# Patient Record
Sex: FEMALE | Race: BLACK OR AFRICAN AMERICAN | NOT HISPANIC OR LATINO | Employment: OTHER | URBAN - METROPOLITAN AREA
[De-identification: names, ages, dates, MRNs, and addresses within clinical notes are randomized per-mention and may not be internally consistent; named-entity substitution may affect disease eponyms.]

---

## 2018-01-11 ENCOUNTER — CLINICAL SUPPORT (OUTPATIENT)
Dept: URGENT CARE | Facility: CLINIC | Age: 33
End: 2018-01-11

## 2018-01-11 DIAGNOSIS — Z02.83 ENCOUNTER FOR DRUG SCREENING: Primary | ICD-10-CM

## 2018-01-11 PROCEDURE — 80305 DRUG TEST PRSMV DIR OPT OBS: CPT | Mod: S$GLB,,, | Performed by: PREVENTIVE MEDICINE

## 2019-02-13 PROBLEM — D64.9 ANEMIA: Status: ACTIVE | Noted: 2019-02-13

## 2019-02-14 PROBLEM — D57.3 SICKLE CELL TRAIT: Status: ACTIVE | Noted: 2019-02-14

## 2019-02-14 PROBLEM — D50.0 IRON DEFICIENCY ANEMIA DUE TO CHRONIC BLOOD LOSS: Status: ACTIVE | Noted: 2019-02-14

## 2019-12-31 ENCOUNTER — OCCUPATIONAL HEALTH (OUTPATIENT)
Dept: URGENT CARE | Facility: CLINIC | Age: 34
End: 2019-12-31

## 2019-12-31 DIAGNOSIS — Z02.83 ENCOUNTER FOR DRUG SCREENING: Primary | ICD-10-CM

## 2019-12-31 PROCEDURE — 80305 OOH COLLECTION ONLY DRUG SCREEN: ICD-10-PCS | Mod: S$GLB,,, | Performed by: PREVENTIVE MEDICINE

## 2019-12-31 PROCEDURE — 80305 DRUG TEST PRSMV DIR OPT OBS: CPT | Mod: S$GLB,,, | Performed by: PREVENTIVE MEDICINE

## 2019-12-31 RX ORDER — HYDROXYPROGESTERONE CAPROATE 250 MG/ML
250 INJECTION INTRAMUSCULAR
COMMUNITY
Start: 2019-12-05 | End: 2020-01-02

## 2019-12-31 RX ORDER — FERROUS SULFATE 325(65) MG
325 TABLET ORAL
COMMUNITY
Start: 2019-09-16 | End: 2020-09-15

## 2019-12-31 RX ORDER — HYDROXYPROGESTERONE CAPROATE 1250 MG/5ML
250 INJECTION INTRAMUSCULAR
COMMUNITY
Start: 2019-12-05

## 2023-02-24 PROBLEM — U07.1 COVID-19: Status: ACTIVE | Noted: 2023-02-24

## 2023-02-28 ENCOUNTER — PATIENT OUTREACH (OUTPATIENT)
Dept: ADMINISTRATIVE | Facility: CLINIC | Age: 38
End: 2023-02-28
Payer: MEDICARE

## 2023-02-28 DIAGNOSIS — U07.1 COVID-19: Primary | ICD-10-CM

## 2023-02-28 NOTE — PROGRESS NOTES
C3 nurse spoke with Peter Long  for a TCC post hospital discharge follow up call. The patient reports does not have a scheduled HOSFU appointment. C3 nurse was unable to schedule HOSFU appointment for [facility]. Patient advised to contact their PCP to schedule a HOSPFU within 5-7 days.     Referral placed for [facility].

## 2023-03-03 ENCOUNTER — PES CALL (OUTPATIENT)
Dept: ADMINISTRATIVE | Facility: CLINIC | Age: 38
End: 2023-03-03
Payer: MEDICARE

## 2023-03-09 ENCOUNTER — OFFICE VISIT (OUTPATIENT)
Dept: HOME HEALTH SERVICES | Facility: CLINIC | Age: 38
End: 2023-03-09
Payer: MEDICAID

## 2023-03-09 DIAGNOSIS — D50.0 IRON DEFICIENCY ANEMIA DUE TO CHRONIC BLOOD LOSS: ICD-10-CM

## 2023-03-09 DIAGNOSIS — U07.1 COVID-19: ICD-10-CM

## 2023-03-09 PROCEDURE — 3078F PR MOST RECENT DIASTOLIC BLOOD PRESSURE < 80 MM HG: ICD-10-PCS | Mod: CPTII,S$GLB,, | Performed by: NURSE PRACTITIONER

## 2023-03-09 PROCEDURE — 99497 ADVNCD CARE PLAN 30 MIN: CPT | Mod: S$GLB,,, | Performed by: NURSE PRACTITIONER

## 2023-03-09 PROCEDURE — 99497 PR ADVNCD CARE PLAN 30 MIN: ICD-10-PCS | Mod: S$GLB,,, | Performed by: NURSE PRACTITIONER

## 2023-03-09 PROCEDURE — 3078F DIAST BP <80 MM HG: CPT | Mod: CPTII,S$GLB,, | Performed by: NURSE PRACTITIONER

## 2023-03-09 PROCEDURE — 99495 TCM SERVICES (MODERATE COMPLEXITY): ICD-10-PCS | Mod: S$GLB,,, | Performed by: NURSE PRACTITIONER

## 2023-03-09 PROCEDURE — 99495 TRANSJ CARE MGMT MOD F2F 14D: CPT | Mod: S$GLB,,, | Performed by: NURSE PRACTITIONER

## 2023-03-09 PROCEDURE — 3075F PR MOST RECENT SYSTOLIC BLOOD PRESS GE 130-139MM HG: ICD-10-PCS | Mod: CPTII,S$GLB,, | Performed by: NURSE PRACTITIONER

## 2023-03-09 PROCEDURE — 3075F SYST BP GE 130 - 139MM HG: CPT | Mod: CPTII,S$GLB,, | Performed by: NURSE PRACTITIONER

## 2023-03-10 VITALS
RESPIRATION RATE: 20 BRPM | SYSTOLIC BLOOD PRESSURE: 134 MMHG | OXYGEN SATURATION: 100 % | HEART RATE: 64 BPM | DIASTOLIC BLOOD PRESSURE: 71 MMHG | TEMPERATURE: 98 F

## 2023-03-10 NOTE — ASSESSMENT & PLAN NOTE
No acute respiratory symptoms  No symptoms of distress  Vitals stable  Continue albuterol prn  Schedule follow up with PCP at [facility]

## 2023-03-10 NOTE — PROGRESS NOTES
"[facility]  Transition of Care Home Visit    Visit Date: 3/9/2023  Encounter Provider: Amarilis Melgar NP  PCP:  Primary Doctor No    PRESENTING HISTORY      Patient ID: Peter Long 37 y.o. female.    Consult Requested By:  Emelia Melgar  Reason for Consult:  Hospital Follow Up    Chief Complaint: Hospital Follow Up    The patient is being seen at home due to a physical debility that presents a taxing effort to leave the home, to mitigate high risk of hospital readmission or due to the limited availability of reliable or safe options for transportation to the point of access to the provider. The visit meets the criteria for medical necessity as defined by CMS as "health-care services needed to prevent, diagnose, or treat an illness, injury, condition, disease, or its symptoms and that meet accepted standards of medicine." Prior to treatment on this visit the chart was reviewed and patient consent was obtained.    HPI: 37 y.o. female with past medical history of severe Covid in past which included intubation, anemia.     Hospital Course: She was treated for covid infection with remdesivir, oxygen.  She was eventually weaned off O2.  She was discharged once stable and sent home with steroids, Zithromax.       Today:  Ms. Peter Long is a 37 y.o. female is being seen and examined at home today for transitional care visit to the home environment post-discharge from inpatient hospitalization encounter described above. Peter presents at baseline state of health as reported by patient and caregiver. VSS. No acute respiratory symptoms noted.  VSS. Denies fevers, chills, cough, congestion, change in bladder habits, change in bowel habits. Reports taking all medications as prescribed. Instructed to schedule follow up with PCP at [facility]. No other needs identified at this time. Risks of environmental exposure to coronavirus discussed including: social distancing, hand hygiene, and " limiting departures from the home for necessities only.  Reports understanding and willingness to comply.     Attestation: Screening criteria to assess the level of the patient's risk for infection with COVID-19 as recommended by the CDC at the time of the above documented home visit concluded appropriateness to proceed. Universal precautions were maintained at all times, including provider use of >60% alcohol gel hand  immediately prior to entry and upon departing the patient's home as well as cleaning of equipment used in home visit with antibacterial/germicidal disposable wipes.    Admission Date: 2/24/2023  Hospital Length of Stay: 2 days  Discharge Date and Time:  02/26/2023 2:04 PM  _________________________________________________________________    Review of Systems   Constitutional:  Negative for chills and fever.   HENT:  Negative for congestion, postnasal drip and rhinorrhea.    Eyes:  Negative for photophobia and visual disturbance.   Respiratory:  Negative for cough, chest tightness, shortness of breath and wheezing.    Cardiovascular:  Negative for chest pain and palpitations.   Gastrointestinal:  Negative for abdominal pain, diarrhea, nausea and vomiting.   Genitourinary:  Negative for difficulty urinating.   Musculoskeletal:  Negative for arthralgias, gait problem and myalgias.   Skin:  Negative for color change.   Neurological:  Negative for dizziness, weakness and light-headedness.   Hematological:  Does not bruise/bleed easily.   Psychiatric/Behavioral:  Negative for agitation.      Assessments:  Environmental: adequate lighting and temperature control  Functional Status: Independent with ADL's/IADL's,  continent of bowel and bladder  Safety: Fall Precautions, COVID Precautions/Social Distancing/Mask Use  Nutritional: Adequate  Home Health: none  DME/Supplies: none    Ethical / Legal: Advance Care Planning   Capacity to make medical decisions:  yes, Conflict no  Surrogate decision maker:   Name Juvenal, Relationship: significant other  Advance Directives:  no  HCPOA: no  LaPOST:  no  Code Status:  full code    Advance Care Planning/Goals of Care:  Advanced Care Directives, HCPOA and LaPost forms left in the home for family review, discussion and signing with instructions to return upon their next provider encounter for inclusion to the medical record. Discussed Advance Care Planning for 20 minutes.    PAST HISTORY:     Past Medical History:   Diagnosis Date    COVID-19     Sickle cell trait        Past Surgical History:   Procedure Laterality Date     SECTION      x5     SECTION      TONSILLECTOMY      tubal reversal  2014 [place].       History reviewed. No pertinent family history.    Social History     Socioeconomic History    Marital status: Single   Tobacco Use    Smoking status: Never    Smokeless tobacco: Never   Substance and Sexual Activity    Alcohol use: No    Drug use: No    Sexual activity: Yes     Partners: Male       MEDICATIONS & ALLERGIES:     Current Outpatient Medications on File Prior to Visit   Medication Sig Dispense Refill    acetaminophen (TYLENOL) 500 mg Cap Take 2 capsules (1,000 mg total) by mouth every 8 (eight) hours as needed (pain/fever). 30 capsule 0    albuterol (PROVENTIL/VENTOLIN HFA) 90 mcg/actuation inhaler Inhale 1-2 puffs into the lungs every 6 (six) hours as needed for Wheezing. Rescue 18 g 0    albuterol (PROVENTIL/VENTOLIN HFA) 90 mcg/actuation inhaler Inhale 1-2 puffs into the lungs every 6 (six) hours as needed for Wheezing or Shortness of Breath. Rescue 6.7 g 0    albuterol sulfate 2.5 mg/0.5 mL Nebu Take 2.5 mg by nebulization every 4 (four) hours as needed. Rescue 30 each 0    ferrous sulfate 325 (65 FE) MG EC tablet Take 2 tablets (650 mg total) by mouth 2 (two) times daily. (Patient not taking: Reported on 2023) 120 tablet 5    HYDROXYprogesterone (TRE) injection Inject 250 mg into the muscle.      pantoprazole (PROTONIX) 40 MG tablet  Take 1 tablet (40 mg total) by mouth once daily. 30 tablet 11    PNV,calcium 72-iron-folic acid (PRENATAL VITAMIN PLUS LOW IRON) 27 mg iron- 1 mg Tab Take 1 tablet by mouth.       No current facility-administered medications on file prior to visit.        Review of patient's allergies indicates:   Allergen Reactions    Dexamethasone Itching       OBJECTIVE:     Vital Signs:  Vitals:    03/09/23 1300   BP: 134/71   Pulse: 64   Resp: 20   Temp: 98 °F (36.7 °C)     There is no height or weight on file to calculate BMI.       Physical Exam  HENT:      Head: Normocephalic and atraumatic.      Nose: No congestion or rhinorrhea.      Mouth/Throat:      Mouth: Mucous membranes are moist.   Cardiovascular:      Rate and Rhythm: Normal rate.      Pulses: Normal pulses.   Pulmonary:      Effort: No respiratory distress.      Breath sounds: Normal breath sounds.   Abdominal:      General: Bowel sounds are normal.      Palpations: Abdomen is soft.   Musculoskeletal:      Cervical back: Normal range of motion and neck supple.      Right lower leg: No edema.      Left lower leg: No edema.   Skin:     General: Skin is warm and dry.   Neurological:      Mental Status: She is alert and oriented to person, place, and time.     Laboratory  Lab Results   Component Value Date    WBC 12.29 02/24/2023    HGB 9.6 (L) 02/24/2023    HCT 33.4 (L) 02/24/2023    MCV 65 (L) 02/24/2023     (L) 02/24/2023     No results found for: INR, PROTIME  No results found for: HGBA1C  No results for input(s): POCTGLUCOSE in the last 72 hours.    TRANSITION OF CARE:     [facility] Contact Note: 2/28/23    Family and/or Caretaker present at visit?  Yes.  Diagnostic tests reviewed/disposition: No diagnosic tests pending after this hospitalization.  Disease/illness education: Importance of Compliance with all prescribed medications and treatments, COVID Precautions/Social Distancing/Mask Use  Home health/community services discussion/referrals:  Patient does not have home health established from hospital visit.  They do not need home health.  If needed, we will set up home health for the patient.   Establishment or re-establishment of referral orders for community resources: No other necessary community resources.   Discussion with other health care providers: No discussion with other health care providers necessary.     Transition of Care Visit:  I have reviewed and updated the history and problem list. I have reconciled the medication list. I have discussed the hospitalization and current medical issues, prognosis and plans with the patient/family.     Medications Reconciliation:   I have reconciled the patient's home medications and discharge medications with the patient/family. I have updated all changes. Refer to After-Visit Medication List.    Discharge plans, follow-up instructions, future appointments, provider contact information, indicators to seek emergency treatment and encouragement to call for any questions, concerns or clarification of the patient's plan of care explained to patient and/or caregiver(s), whom confirm understanding of provided information and endorse willingness to comply.     ASSESSMENT & PLAN:     HIGH RISK CONDITION(S):  Patient has a condition that poses threat to life and bodily function: s/p recent hospitalization for covid 19.      1. COVID-19  Overview:  Hx of severe Covid on vent.  Lost three toes.  Was pregnant and had to have an emergency c sectionn.  Now for the past two days she has had a fever reported as 104 degrees, nausea, vomiting and minimal to moderate respiratory symptome  Feels better already on steroids and oxygen.      Assessment & Plan:  No acute respiratory symptoms  No symptoms of distress  Vitals stable  Continue albuterol prn  Schedule follow up with PCP at [facility]     Orders:  -     Ambulatory referral/consult to [facility]    2. Iron deficiency anemia due to chronic  blood loss  Assessment & Plan:  No visual signs of bleeding  No taking iron supplement  Denies weakness, fatigue   Schedule follow up with PCP at [facility]               Encounter for Support and Coordination of Transition of Care     Instructions:  - Continue all medications, treatments and therapies as ordered.   - Follow all instructions, recommendations as discussed.  - Maintain Safety Precautions at all times.  - Attend all medical appointments as scheduled.  - For worsening symptoms: call Primary Care Physician or Nurse Practitioner.  - For emergencies, call 911 or immediately report to the nearest emergency room.  - Limit Risks of environmental exposure to coronavirus/COVID-19 as discussed including: social distancing, hand hygiene, and limiting departures from the home for necessities only.        Were controlled substances prescribed?  No      Scheduled Follow-up :  No future appointments.    After Visit Medication List :     Medication List            Accurate as of March 9, 2023 11:59 PM. If you have any questions, ask your nurse or doctor.                CONTINUE taking these medications      acetaminophen 500 mg Cap  Commonly known as: TYLENOL  Take 2 capsules (1,000 mg total) by mouth every 8 (eight) hours as needed (pain/fever).     * albuterol 90 mcg/actuation inhaler  Commonly known as: PROVENTIL/VENTOLIN HFA  Inhale 1-2 puffs into the lungs every 6 (six) hours as needed for Wheezing. Rescue     * albuterol sulfate 2.5 mg/0.5 mL Nebu  Take 2.5 mg by nebulization every 4 (four) hours as needed. Rescue     * albuterol 90 mcg/actuation inhaler  Commonly known as: PROVENTIL/VENTOLIN HFA  Inhale 1-2 puffs into the lungs every 6 (six) hours as needed for Wheezing or Shortness of Breath. Rescue     ferrous sulfate 325 (65 FE) MG EC tablet  Take 2 tablets (650 mg total) by mouth 2 (two) times daily.     HYDROXYprogesterone injection  Commonly known as: TRE     pantoprazole 40 MG  tablet  Commonly known as: PROTONIX  Take 1 tablet (40 mg total) by mouth once daily.     PNV,calcium 72-iron-folic acid 27 mg iron- 1 mg Tab  Commonly known as: PRENATAL VITAMIN PLUS LOW IRON           * This list has 3 medication(s) that are the same as other medications prescribed for you. Read the directions carefully, and ask your doctor or other care provider to review them with you.                  Patient consent was obtained prior to treatment on this visit.    Attestation: Screening criteria to assess the level of the patient's risk for infection with COVID-19 as recommended by the CDC at the time of the above documented home visit concluded appropriateness to proceed. Universal precautions were maintained at all times, including provider use of >60% alcohol gel hand  immediately prior to entry and upon departing the patient's home as well as cleaning of equipment used in home visit with antibacterial/germicidal disposable wipes.     Signature:       JYOTI Gregory-C   [facility]    Total Face-to-Face Encounter: 60 minutes with >50% of time spent discussing the care with the patient/family.

## 2023-03-10 NOTE — ASSESSMENT & PLAN NOTE
No visual signs of bleeding  No taking iron supplement  Denies weakness, fatigue   Schedule follow up with PCP at [facility]

## 2023-04-12 ENCOUNTER — NURSE TRIAGE (OUTPATIENT)
Dept: ADMINISTRATIVE | Facility: CLINIC | Age: 38
End: 2023-04-12
Payer: MEDICARE

## 2023-04-12 NOTE — TELEPHONE ENCOUNTER
Pt was seen in Er last night but does not her prescription for her throat lozenges. Spoke with nurse in er stated for pt to have pharmacist call the Er. Pt given the number. [phone]  Reason for Disposition   Prescription not at pharmacy and was prescribed by PCP recently  (Exception: triager has access to EMR and prescription is recorded there. Go to Home Care and confirm for pharmacy.)    Protocols used: Medication Question Call-A-OH

## 2023-11-11 PROBLEM — J10.1 INFLUENZA B: Status: ACTIVE | Noted: 2023-11-11

## 2025-01-15 ENCOUNTER — OFFICE VISIT (OUTPATIENT)
Dept: ORTHOPEDICS | Facility: CLINIC | Age: 40
End: 2025-01-15
Payer: MEDICARE

## 2025-01-15 VITALS
HEIGHT: 62 IN | HEART RATE: 94 BPM | DIASTOLIC BLOOD PRESSURE: 72 MMHG | BODY MASS INDEX: 35.67 KG/M2 | SYSTOLIC BLOOD PRESSURE: 111 MMHG

## 2025-01-15 DIAGNOSIS — M25.461 EFFUSION OF RIGHT KNEE: Primary | ICD-10-CM

## 2025-01-15 DIAGNOSIS — M25.561 ACUTE PAIN OF RIGHT KNEE: ICD-10-CM

## 2025-01-15 PROCEDURE — 99214 OFFICE O/P EST MOD 30 MIN: CPT | Mod: PBBFAC,PN

## 2025-01-15 PROCEDURE — 99204 OFFICE O/P NEW MOD 45 MIN: CPT | Mod: S$PBB,,,

## 2025-01-15 PROCEDURE — 99999 PR PBB SHADOW E&M-EST. PATIENT-LVL IV: CPT | Mod: PBBFAC,,,

## 2025-01-15 NOTE — PROGRESS NOTES
Patient ID: Peter Long is a 39 y.o. female    Pain of the Right Knee and Pain of the Right Hip    History of Present Illness:    Peter Long presents to clinic for right knee and hip pain. Patient denies known BHAVANI. The pain started 2 months ago intermittently. States back in 2024 she all of a sudden had right knee pain, no falls or change in activity. She went to the ER and no fx or dislocations noted. Pain comes and goes, no pain today. When pain is present, located to medial and lateral knee. It would radiate to outside of hip.  She reports that the pain is a 0 /10 pain today. The pain is affecting ADLs and limiting desired level of activity. Denies numbness, tingling, radiation and inability to bear weight. Denies hx of gout or autoimmune conditions.     Trial of ice, elevation, brace, tylneol #3 with no improvement. Overall pain has been the same, just still on and off.     Occupation:     Ambulating: unassisted  Diabetic: no  Smoking: no  Hx of DVT/PE: no    PAST MEDICAL HISTORY:   Past Medical History:   Diagnosis Date    COVID-19     Sickle cell trait      PAST SURGICAL HISTORY:   Past Surgical History:   Procedure Laterality Date     SECTION      x5     SECTION      TONSILLECTOMY      tubal reversal  2014 [place].     FAMILY HISTORY: No family history on file.  SOCIAL HISTORY:   Social History     Occupational History    Not on file   Tobacco Use    Smoking status: Never    Smokeless tobacco: Never   Substance and Sexual Activity    Alcohol use: No    Drug use: No    Sexual activity: Yes     Partners: Male     Birth control/protection: None        MEDICATIONS:   Current Outpatient Medications:     acetaminophen (TYLENOL) 500 mg Cap, Take 2 capsules (1,000 mg total) by mouth every 8 (eight) hours as needed (pain/fever)., Disp: 30 capsule, Rfl: 0    acetaminophen (TYLENOL) 500 MG tablet, Take 2 tablets (1,000 mg total) by mouth every 8 (eight) hours as needed for  Pain., Disp: 60 tablet, Rfl: 0    amitriptyline (ELAVIL) 10 MG tablet, 1 tablet at bedtime as needed Orally Once a day, Disp: , Rfl:     benzocaine-menthoL 15-3.6 mg Lozg, 1 lozenge by Mucous Membrane route every 4 to 6 hours as needed., Disp: 18 lozenge, Rfl: 0    budesonide-formoterol 160-4.5 mcg (SYMBICORT) 160-4.5 mcg/actuation HFAA, 2 puffs Inhalation Twice a day, Disp: , Rfl:     cyclobenzaprine (FLEXERIL) 10 MG tablet, Take 1 tablet (10 mg total) by mouth every evening., Disp: 15 tablet, Rfl: 0    ferrous sulfate 325 (65 FE) MG EC tablet, Take 2 tablets (650 mg total) by mouth 2 (two) times daily., Disp: 120 tablet, Rfl: 5    fluticasone propionate (FLONASE) 50 mcg/actuation nasal spray, 1 spray (50 mcg total) by Each Nostril route 2 (two) times daily as needed., Disp: 15 g, Rfl: 0    gabapentin (NEURONTIN) 800 MG tablet, TAKE 1 CAPSULE BY MOUTH THREE TIMES DAILY AS NEEDED Orally, Disp: , Rfl:     HYDROXYprogesterone (TRE) injection, Inject 250 mg into the muscle., Disp: , Rfl:     ibuprofen (ADVIL,MOTRIN) 800 MG tablet, Take 1 tablet (800 mg total) by mouth every 6 (six) hours as needed for Pain., Disp: 20 tablet, Rfl: 0    ketorolac (TORADOL) 10 mg tablet, Take 1 tablet (10 mg total) by mouth every 6 (six) hours as needed for Pain., Disp: 15 tablet, Rfl: 0    albuterol (PROVENTIL/VENTOLIN HFA) 90 mcg/actuation inhaler, Inhale 1-2 puffs into the lungs every 6 (six) hours as needed for Wheezing or Shortness of Breath. Rescue, Disp: 6.7 g, Rfl: 0    ALPRAZolam (XANAX) 1 MG tablet, Take 1 tablet (1 mg total) by mouth 2 (two) times daily as needed for Anxiety (panic)., Disp: 20 tablet, Rfl: 0    pantoprazole (PROTONIX) 40 MG tablet, Take 1 tablet (40 mg total) by mouth once daily., Disp: 30 tablet, Rfl: 11    PNV,calcium 72-iron-folic acid (PRENATAL VITAMIN PLUS LOW IRON) 27 mg iron- 1 mg Tab, Take 1 tablet by mouth., Disp: , Rfl:   ALLERGIES:   Review of patient's allergies indicates:   Allergen Reactions     Dexamethasone Itching         Physical Exam     Vitals:    01/15/25 1357   BP: 111/72   Pulse: 94     Alert and oriented to person, place and time. No acute distress. Well-groomed, not ill appearing. Pupils round and reactive, normal respiratory effort, no audible wheezing.       General:  The patient is alert and oriented x 3.  Mood is pleasant.      right HIP EXAMINATION     OBSERVATION / INSPECTION   Gait:   Nonantalgic   Alignment:  Neutral   Scars:   None   Muscle atrophy: None  Effusion:  Moderate    Warmth:  None   Discoloration:   None     TENDERNESS                 Patella     Negative    Peripatellar     Negative   Patellar tendon   Negative   Quad tendon     Negative    Prepatellar Bursa   Negative     Tibial tubercle    Negative    Pes anserine/HS   Negative      ITB     Negative      LCL     Negative  MFC     Negative  LFC     Negative  MCL      Negative  Medial Joint Line    Negative   Lateral Joint Line   Negative  Quadriceps    Negative  Hamstring     Negative            Range of  Motion:        Left :   0-140°  Right :    0-140°      Patellofemoral examination:     Patella position    Centered  Crepitus (PF):    Absent   Lateral tilt:    Normal   J-sign:     None   Patellofemoral grind:   +      MENISCAL SIGNS:     Pain on terminal extension:  +  Pain on terminal flexion:  Negative  Vees maneuver:  +    LIGAMENT EXAMINATION:  ACL / Lachman:  IA   PCL-Post.  drawer: normal   MCL- Valgus:  normal   LCL- Varus:    normal   Dial Test:   Normal       EXTREMITY NEURO-VASCULAR EXAMINATION:   Sensation:  Grossly intact to light touch all dermatomal regions.   Motor Function:  Fully intact motor function at hip, knee, foot and ankle    DTRs;  quadriceps and  achilles 2+.  No clonus and downgoing Babinski.    Vascular status:  DP and PT pulses 2+, brisk capillary refill, symmetric.    Skin: intact, compartments soft.    Imaging:     3 view  right Knee and hip X-rays ordered/reviewed by me showing no  evidence of fracture or dislocation. There is no obvious malalignment. No evidence of masses, lesions or foreign bodies.       Assessment & Plan    Effusion of right knee  -     Ambulatory referral/consult to Orthopedics  -     MRI Knee Without Contrast Right; Future; Expected date: 01/15/2025    Acute pain of right knee  -     Ambulatory referral/consult to Orthopedics  -     MRI Knee Without Contrast Right; Future; Expected date: 01/15/2025         I made the decision to obtain old records of the patient including previous notes and imaging. New imaging was ordered today of the extremity or extremities evaluated. I independently reviewed and interpreted the radiographs and/or MRIs/CT scan today as well as prior imaging.    We discussed at length different treatment options including conservative vs surgical management. These include anti-inflammatories, acetaminophen, rest, ice, heat, formal physical therapy including strengthening and stretching exercises, home exercise programs, injections, dry needling, and finally surgical intervention.      Patient here for follow up of subacute right knee pain.  No known mechanism injury.  She does have moderate effusion today and positive maneuvers for meniscal pathology.  We will proceed with an MRI of the right knee to evaluate internal structures.  MRI right knee ordered.  Continue anti-inflammatories and rice treatment until results.  May consider aspiration.    Follow up:  MRI results  X-rays next visit:  None    All questions were answered and patient is agreeable to the above plan.

## 2025-01-27 ENCOUNTER — TELEPHONE (OUTPATIENT)
Dept: ORTHOPEDICS | Facility: CLINIC | Age: 40
End: 2025-01-27
Payer: MEDICARE

## 2025-01-27 NOTE — TELEPHONE ENCOUNTER
----- Message from Carmelina Philip PA-C sent at 1/27/2025  7:18 AM CST -----  Can we do a virtual earlier than her 2/25 appt? Okay to DB

## 2025-01-28 ENCOUNTER — OFFICE VISIT (OUTPATIENT)
Dept: ORTHOPEDICS | Facility: CLINIC | Age: 40
End: 2025-01-28
Payer: MEDICARE

## 2025-01-28 DIAGNOSIS — M25.461 EFFUSION OF RIGHT KNEE: Primary | ICD-10-CM

## 2025-01-28 DIAGNOSIS — M94.261 CHONDROMALACIA OF KNEE, RIGHT: ICD-10-CM

## 2025-01-28 DIAGNOSIS — M23.41 BODIES, LOOSE, JOINT, KNEE, RIGHT: ICD-10-CM

## 2025-01-28 PROCEDURE — 98004 SYNCH AUDIO-VIDEO EST SF 10: CPT | Mod: 95,,,

## 2025-01-28 NOTE — PROGRESS NOTES
H&P  Orthopaedics      Telemedicine visit:  The patient location is: home  The chief complaint leading to consultation is: MRI results  Visit type: Virtual visit with synchronous audio and video  Total time spent with patient: start: 8:28 end:8:42  Each patient to whom he or she provides medical services by telemedicine is:  (1) informed of the relationship between the physician and patient and the respective role of any other health care provider with respect to management of the patient; and (2) notified that he or she may decline to receive medical services by telemedicine and may withdraw from such care at any time.    HPI:    Patient has virtual to discuss right knee MRI results.  She does continue to note instability and pain in her knee without known mechanism of injury.    Imaging:    MRI right knee:  1. Full-thickness chondral defect over the central weight-bearing femoral condyle.  Patellofemoral chondral fissuring.  2. Small joint effusion with several small loose bodies posterior to the femoral metaphysis.  3. Edema of superolateral Hoffa's fat pad may be seen with patellar tendon lateral femoral condyle friction syndrome.  4. Thickening of MCL, perhaps previous sprain.    A/P:  Patient with chronic right knee pain.  We discussed her MRI results in detail which does show cartilage damage as well as several loose bodies.  We discussed loose bodies are indication for surgery.  We discussed knee arthroscopy with chondroplasty and removal of loose bodies in detail.  We discussed the rehab, risks and benefits of surgery.  Patient elects to proceed with surgery on February 17th.  Case request placed, we will have her follow up in person for a preop appointment.  She will contact the clinic with any further questions or concerns in the meantime.

## 2025-02-06 ENCOUNTER — OFFICE VISIT (OUTPATIENT)
Dept: ORTHOPEDICS | Facility: CLINIC | Age: 40
End: 2025-02-06
Payer: MEDICARE

## 2025-02-06 VITALS
SYSTOLIC BLOOD PRESSURE: 106 MMHG | DIASTOLIC BLOOD PRESSURE: 71 MMHG | BODY MASS INDEX: 34.62 KG/M2 | WEIGHT: 189.25 LBS | HEART RATE: 90 BPM

## 2025-02-06 DIAGNOSIS — M25.461 EFFUSION OF RIGHT KNEE: ICD-10-CM

## 2025-02-06 DIAGNOSIS — M23.41 BODIES, LOOSE, JOINT, KNEE, RIGHT: Primary | ICD-10-CM

## 2025-02-06 DIAGNOSIS — M94.261 CHONDROMALACIA OF KNEE, RIGHT: ICD-10-CM

## 2025-02-06 DIAGNOSIS — Z01.818 PRE-OPERATIVE EXAMINATION: ICD-10-CM

## 2025-02-06 PROCEDURE — 99499 UNLISTED E&M SERVICE: CPT | Mod: S$PBB,,,

## 2025-02-06 PROCEDURE — 99999 PR PBB SHADOW E&M-EST. PATIENT-LVL IV: CPT | Mod: PBBFAC,,,

## 2025-02-06 PROCEDURE — 99214 OFFICE O/P EST MOD 30 MIN: CPT | Mod: PBBFAC,PN

## 2025-02-06 NOTE — H&P
Patient ID: Peter Long is a 39 y.o. female    Pain of the Right Knee and Pre-op Exam    History of Present Illness:    Peter Long presents to clinic for right knee and hip pain. Patient denies known BHAVANI. The pain started 2 months ago intermittently. States back in 2024 she all of a sudden had right knee pain, no falls or change in activity. She went to the ER and no fx or dislocations noted. Pain comes and goes, no pain today. When pain is present, located to medial and lateral knee. It would radiate to outside of hip.  She reports that the pain is a 0 /10 pain today. The pain is affecting ADLs and limiting desired level of activity. Denies numbness, tingling, radiation and inability to bear weight. Denies hx of gout or autoimmune conditions.     Trial of ice, elevation, brace, tylneol #3 with no improvement. Overall pain has been the same, just still on and off.     Occupation:     Ambulating: unassisted  Diabetic: no  Smoking: no  Hx of DVT/PE: no    ____________________________________________________________________    Interval history 2025: Patient returns today for follow up of right knee pain.  She had an MRI which showed chondromalacia as well as multiple loose bodies.  We had a virtual to discuss results and she elected to proceed with right knee arthroscopy.  She is scheduled for , here today for preop appointment.        PAST MEDICAL HISTORY:   Past Medical History:   Diagnosis Date    COVID-19     Sickle cell trait      PAST SURGICAL HISTORY:   Past Surgical History:   Procedure Laterality Date     SECTION      x5     SECTION      TONSILLECTOMY      tubal reversal  2014 [facility].     FAMILY HISTORY: No family history on file.  SOCIAL HISTORY:   Social History     Occupational History    Not on file   Tobacco Use    Smoking status: Never    Smokeless tobacco: Never   Substance and Sexual Activity    Alcohol use: No    Drug use: No    Sexual  activity: Yes     Partners: Male     Birth control/protection: None        MEDICATIONS:   Current Outpatient Medications:     acetaminophen (TYLENOL) 500 mg Cap, Take 2 capsules (1,000 mg total) by mouth every 8 (eight) hours as needed (pain/fever)., Disp: 30 capsule, Rfl: 0    acetaminophen (TYLENOL) 500 MG tablet, Take 2 tablets (1,000 mg total) by mouth every 8 (eight) hours as needed for Pain., Disp: 60 tablet, Rfl: 0    albuterol (PROVENTIL/VENTOLIN HFA) 90 mcg/actuation inhaler, Inhale 1-2 puffs into the lungs every 6 (six) hours as needed for Wheezing or Shortness of Breath. Rescue, Disp: 6.7 g, Rfl: 0    ALPRAZolam (XANAX) 1 MG tablet, Take 1 tablet (1 mg total) by mouth 2 (two) times daily as needed for Anxiety (panic)., Disp: 20 tablet, Rfl: 0    amitriptyline (ELAVIL) 10 MG tablet, 1 tablet at bedtime as needed Orally Once a day, Disp: , Rfl:     benzocaine-menthoL 15-3.6 mg Lozg, 1 lozenge by Mucous Membrane route every 4 to 6 hours as needed., Disp: 18 lozenge, Rfl: 0    budesonide-formoterol 160-4.5 mcg (SYMBICORT) 160-4.5 mcg/actuation HFAA, 2 puffs Inhalation Twice a day, Disp: , Rfl:     cyclobenzaprine (FLEXERIL) 10 MG tablet, Take 1 tablet (10 mg total) by mouth every evening., Disp: 15 tablet, Rfl: 0    ferrous sulfate 325 (65 FE) MG EC tablet, Take 2 tablets (650 mg total) by mouth 2 (two) times daily., Disp: 120 tablet, Rfl: 5    fluticasone propionate (FLONASE) 50 mcg/actuation nasal spray, 1 spray (50 mcg total) by Each Nostril route 2 (two) times daily as needed., Disp: 15 g, Rfl: 0    gabapentin (NEURONTIN) 800 MG tablet, TAKE 1 CAPSULE BY MOUTH THREE TIMES DAILY AS NEEDED Orally, Disp: , Rfl:     HYDROXYprogesterone (TRE) injection, Inject 250 mg into the muscle., Disp: , Rfl:     ibuprofen (ADVIL,MOTRIN) 800 MG tablet, Take 1 tablet (800 mg total) by mouth every 6 (six) hours as needed for Pain., Disp: 20 tablet, Rfl: 0    ketorolac (TORADOL) 10 mg tablet, Take 1 tablet (10 mg total)  by mouth every 6 (six) hours as needed for Pain., Disp: 15 tablet, Rfl: 0    pantoprazole (PROTONIX) 40 MG tablet, Take 1 tablet (40 mg total) by mouth once daily., Disp: 30 tablet, Rfl: 11    PNV,calcium 72-iron-folic acid (PRENATAL VITAMIN PLUS LOW IRON) 27 mg iron- 1 mg Tab, Take 1 tablet by mouth., Disp: , Rfl:   ALLERGIES:   Review of patient's allergies indicates:   Allergen Reactions    Dexamethasone Itching         Physical Exam     Vitals:    02/06/25 0936   BP: 106/71   Pulse: 90     Alert and oriented to person, place and time. No acute distress. Well-groomed, not ill appearing. Pupils round and reactive, normal respiratory effort, no audible wheezing.       General:  The patient is alert and oriented x 3.  Mood is pleasant.      right HIP EXAMINATION     OBSERVATION / INSPECTION   Gait:   Nonantalgic   Alignment:  Neutral   Scars:   None   Muscle atrophy: None  Effusion:  Moderate    Warmth:  None   Discoloration:   None     TENDERNESS                 Patella     Negative    Peripatellar     Negative   Patellar tendon   Negative   Quad tendon     Negative    Prepatellar Bursa   Negative     Tibial tubercle    Negative    Pes anserine/HS   Negative      ITB     Negative      LCL     Negative  MFC     Negative  LFC     Negative  MCL      Negative  Medial Joint Line    Negative   Lateral Joint Line   Negative  Quadriceps    Negative  Hamstring     Negative            Range of  Motion:        Left :   0-140°  Right :    0-140°      Patellofemoral examination:     Patella position    Centered  Crepitus (PF):    Absent   Lateral tilt:    Normal   J-sign:     None   Patellofemoral grind:   +      MENISCAL SIGNS:     Pain on terminal extension:  +  Pain on terminal flexion:  Negative  Vees maneuver:  +    LIGAMENT EXAMINATION:  ACL / Lachman:  IA   PCL-Post.  drawer: normal   MCL- Valgus:  normal   LCL- Varus:    normal   Dial Test:   Normal       EXTREMITY NEURO-VASCULAR EXAMINATION:   Sensation:  Grossly  intact to light touch all dermatomal regions.   Motor Function:  Fully intact motor function at hip, knee, foot and ankle    DTRs;  quadriceps and  achilles 2+.  No clonus and downgoing Babinski.    Vascular status:  DP and PT pulses 2+, brisk capillary refill, symmetric.    Skin: intact, compartments soft.    Imaging:     3 view  right Knee and hip X-rays ordered/reviewed by me showing no evidence of fracture or dislocation. There is no obvious malalignment. No evidence of masses, lesions or foreign bodies.     MRI right knee:  1. Full-thickness chondral defect over the central weight-bearing femoral condyle.  Patellofemoral chondral fissuring.  2. Small joint effusion with several small loose bodies posterior to the femoral metaphysis.  3. Edema of superolateral Hoffa's fat pad may be seen with patellar tendon lateral femoral condyle friction syndrome.  4. Thickening of MCL, perhaps previous sprain.    Assessment & Plan    Bodies, loose, joint, knee, right    Pre-operative examination  -     X-Ray Chest 1 View Pre-OP; Future; Expected date: 02/06/2025  -     CBC Auto Differential; Future; Expected date: 02/06/2025  -     EKG 12-lead; Future  -     Basic Metabolic Panel; Future; Expected date: 02/06/2025    Effusion of right knee    Chondromalacia of knee, right       Patient returns for follow up of right knee pain.  We discussed her MRI results again which show loose bodies as well as chondromalacia.  We discussed conservative treatment with bracing as well as physical therapy and operatively recommended right shoulder arthroscopy to remove loose bodies.  She would like to proceed with surgery.  We discussed the overall timeline for healing.  She is already set up with physical therapy.    1. Imaging results reviewed today and discussed with Cedrick Paula MD. We reviewed with Peter Long today, the pathology and natural history of her diagnosis. We have discussed a variety of treatment options  including medications, physical therapy and other alternative treatments. I also explained the indications, risks and benefits of surgery. After discussion, she decided to proceed with surgery. The decision was made to go forward with:  Right knee arthroscopy with chondroplasty  Right knee removal of loose bodies  Any other indicated procedures     The details of the surgical procedure were explained, including the location of probable incisions and a description of likely hardware and/or grafts to be used. The patient understands the likely convalescence after surgery. Also, we have thoroughly discussed the risks, benefits and alternatives to surgery, including, but not limited to, the risk of infection, joint stiffness, blood clot (including DVT and/or pulmonary embolus), neurologic and vascular injury.  It was explained that, if tissue has been repaired or reconstructed, there is a chance of failure, which may require further management.    I made the decision to obtain old records of the patient including previous notes and imaging. I independently reviewed and interpreted lab results today as well as prior imaging.     Post-Op Medications to be prescribed:   Percocet 5/325mg Take 1-2 tablets every 4-6 hours PRN pain #28  Zofran 4mg oral disintegrating tablets every 8 hours PRN nausea/vomiting   Motrin 600 mg TID PRN     Medical Clearance: No- labs ordered  Hx of DVT,PE, anesthetic complications: No     Additional notes/concerns:  None     Opioid Disclosure  Today we discussed the reasons why the prescription is necessary as well as: the risks of addiction and overdose associated with opioid drugs and the dangers of taking opioid drugs with alcohol, benzodiazepines and other central nervous system depressants; alternative treatments that may be available; the risks associated with the use of the drugs being prescribed, specifically that opioids are highly addictive, even when taken as prescribed, that there is a  risk of developing a physical or psychological dependence on the controlled dangerous substance, and that the risks of taking more opioids than prescribed or mixing sedatives, benzodiazepines or alcohol with opioids can result in fatal respiratory depression.    Follow up:  2 weeks postop for wound check and range-of-motion  X-rays next visit:  None    All questions were answered and patient is agreeable to the above plan.

## 2025-02-17 DIAGNOSIS — G89.18 ACUTE POST-OPERATIVE PAIN: Primary | ICD-10-CM

## 2025-02-17 RX ORDER — ONDANSETRON 4 MG/1
4 TABLET, ORALLY DISINTEGRATING ORAL 2 TIMES DAILY
Qty: 20 TABLET | Refills: 0 | Status: SHIPPED | OUTPATIENT
Start: 2025-02-17

## 2025-02-17 RX ORDER — IBUPROFEN 600 MG/1
600 TABLET ORAL 3 TIMES DAILY
Qty: 60 TABLET | Refills: 0 | Status: SHIPPED | OUTPATIENT
Start: 2025-02-17

## 2025-02-17 RX ORDER — OXYCODONE AND ACETAMINOPHEN 5; 325 MG/1; MG/1
1 TABLET ORAL EVERY 4 HOURS PRN
Qty: 28 EACH | Refills: 0 | Status: SHIPPED | OUTPATIENT
Start: 2025-02-17

## 2025-02-19 ENCOUNTER — CLINICAL SUPPORT (OUTPATIENT)
Dept: REHABILITATION | Facility: HOSPITAL | Age: 40
End: 2025-02-19
Payer: MEDICARE

## 2025-02-19 DIAGNOSIS — M94.261 CHONDROMALACIA OF KNEE, RIGHT: ICD-10-CM

## 2025-02-19 DIAGNOSIS — M23.41 BODIES, LOOSE, JOINT, KNEE, RIGHT: ICD-10-CM

## 2025-02-19 DIAGNOSIS — M25.461 EFFUSION OF RIGHT KNEE: ICD-10-CM

## 2025-02-19 DIAGNOSIS — M25.569 ACUTE KNEE PAIN, UNSPECIFIED LATERALITY: Primary | ICD-10-CM

## 2025-02-19 PROCEDURE — 97162 PT EVAL MOD COMPLEX 30 MIN: CPT | Mod: PN

## 2025-02-19 PROCEDURE — 97110 THERAPEUTIC EXERCISES: CPT | Mod: PN

## 2025-02-19 NOTE — PATIENT INSTRUCTIONS
Warm-up      Supine    Heel slides right LE with strap - 30 reps   Quad Sets with towel right LE - 30 reps with 5 sec. Hold   SLR rightLE  - 30 reps   Heel slides with and without strap - 30 reps   Hip Abduction with green TB and Adduction - 30 reps   Prone HS curl with right LE - 30 reps   Patella Mob - 1' in each direction    Seated        Standing

## 2025-02-19 NOTE — PROGRESS NOTES
Outpatient Rehab    Physical Therapy Evaluation    Patient Name: Peter Long  MRN: [id number]  YOB: 1985  Encounter Date: 2/19/2025    Therapy Diagnosis:   Encounter Diagnoses   Name Primary?    Effusion of right knee     Bodies, loose, joint, knee, right     Chondromalacia of knee, right     Acute knee pain, unspecified laterality Yes     Physician: Carmelina Philip, *        Physician Orders: PT Eval and Treat   Medical Diagnosis from Referral: effusion of the right knee  Evaluation Date: 2/19/2025  Insurance Authorization Period Expiration: 1/28/26  Plan of Care Certification:  2/24/2025 to 5/12/25   Progress Note Due: 3/26/2025  Visit # / Visits authorized: 1/ 1  Visits Remaining - 0  PTA visit #: 0/6    Time In: 1100  Time Out: 1200  Total Appointment Time : 60 minutes  Total Billable Time: 60    Intake Outcome Measure for FOTO Survey    Therapist reviewed FOTO scores for Peter Long on 2/19/2025.   FOTO report - see Media section or FOTO account episode details.     Intake Score:  %         Subjective   History of Present Illness  Pteer is a 39 y.o. female who reports to physical therapy with a chief concern of swelling in the right knee since 10/2024 which lead to surgery.     The patient reports a medical diagnosis of M25.461 (ICD-10-CM) - Effusion of right knee  M23.41 (ICD-10-CM) - Bodies, loose, joint, knee, right  M94.261 (ICD-10-CM) - Chondromalacia of knee, right. The patient has experienced this issue since 02/19/25.   Diagnostic tests related to this condition: MRI studies.   MRI Studies Details: Patellofemoral: There is partial-thickness chondral fissuring over the patella and trochlea. Medial tibiofemoral: There is a full-thickness 0.9 x 0.5 cm cartilage defect at the central weight-bearing femoral condyle. Lateral tibiofemoral: Articular cartilage is maintained.    History of Present Condition/Illness: Patient denies any increase in symptoms following surgical  intervention     Pain     Patient reports a current pain level of 4/10. Pain at best is reported as 4/10. Pain at worst is reported as 10/10.   Clinical Progression (since onset): Stable  Pain Qualities: Burning, Aching  Pain-Relieving Factors: Rest  Pain-Aggravating Factors: Walking, Standing, Stair climbing         Living Arrangements  Living Situation  Housing: Home independently    Home Setup  Type of Structure: House  Number of Levels in Home: One level        Employment  Employment Status: Not applicable          Past Medical History/Physical Systems Review:   Peter Long  has a past medical history of Asthma, COVID-19, Increased heart rate, and Sickle cell trait.    Peter Long  has a past surgical history that includes Tonsillectomy;  section; tubal reversal ( [facility] insertion, peg tube (); PEG tube removal (); Amputation (Left, ); Arthroscopy of knee (Right, 2025); Arthroscopic chondroplasty of knee joint (Right, 2025); and arthroscopy, knee, with loose body removal (Right, 2025).    Peter has a current medication list which includes the following prescription(s): albuterol, alprazolam, amitriptyline, budesonide-formoterol 160-4.5 mcg, ferrous sulfate, fluticasone propionate, gabapentin, ibuprofen, ibuprofen, metoprolol succinate, ondansetron, and oxycodone-acetaminophen.    Review of patient's allergies indicates:   Allergen Reactions    Dexamethasone Itching        Objective   Knee Observations  Right Knee Observations  Present: Edema            Lower Extremity Sensation  General Lumbar/Lower Extremity Sensation  Impaired: Right and Left                      Knee Swelling  Location of Measurement Right  (cm) Left  (cm)   20 cm Above Joint Line       10 cm Vastus Medialis Oblique       At Joint Line 49 41.5   15 cm Below Joint Line                 Knee Range of Motion   Right Knee   Active (deg) Passive (deg) Pain   Flexion 80   Yes   Extension -15    Yes       Left Knee   Active (deg) Passive (deg) Pain   Flexion 130       Extension 0                          Knee Strength   Right Strength Right Pain Left Strength Left  Pain   Flexion (S2) 4   0     Prone Flexion 4   0     Extension (L3) 4   0                   Sit to Stand Testing      The patient completed 0 repetitions of a sit to stand transfer in 30 seconds.           Gait Analysis  Base of Support: Normal  Walking Speed: Decreased    Right Side Walking Observations  Decreased: Stance Time, Swing Time, and Step Length       Left Side Walking Observations  Decreased: Stance Time, Swing Time, and Step Length            Treatment:       Assessment & Plan   Assessment  Peter presents with a condition of Moderate complexity.   Presentation of Symptoms: Stable  Will Comorbidities Impact Care: No       Functional Limitations: Activity tolerance, Gait limitations  Impairments: Abnormal gait  Personal Factors Affecting Prognosis: Pain    Prognosis: Fair    Plan  From a physical therapy perspective, the patient would benefit from: Skilled Rehab Services    Planned therapy interventions include: Therapeutic activities, Therapeutic exercise, Neuromuscular re-education, and Manual therapy.    Planned modalities to include: Electrical stimulation - passive/unattended.        Visit Frequency: 2 times Per Week for 12 Weeks.       This plan was discussed with Patient and Therapy assistant.              Patient's spiritual, cultural, and educational needs considered and patient agreeable to plan of care and goals.           Goals:   Active       LTG        Patient will improve right lower extremity  knee extension to 0 degrees        Start:  02/19/25    Expected End:  05/14/25            Patient will improve knee flexion range of motion > 120 degrees        Start:  02/19/25    Expected End:  05/14/25               LTG        Patient will increase right lower extremity  quadriceps strength to 4/5       Start:  02/19/25     Expected End:  05/14/25                Hai Renteria, PT

## 2025-02-24 ENCOUNTER — CLINICAL SUPPORT (OUTPATIENT)
Dept: REHABILITATION | Facility: HOSPITAL | Age: 40
End: 2025-02-24
Payer: MEDICARE

## 2025-02-24 DIAGNOSIS — M25.561 ACUTE PAIN OF RIGHT KNEE: Primary | ICD-10-CM

## 2025-02-24 PROBLEM — M25.569 ACUTE KNEE PAIN: Status: ACTIVE | Noted: 2025-02-24

## 2025-02-24 PROCEDURE — 97110 THERAPEUTIC EXERCISES: CPT | Mod: PN,CQ

## 2025-02-24 PROCEDURE — 97116 GAIT TRAINING THERAPY: CPT | Mod: PN,CQ

## 2025-02-24 NOTE — PROGRESS NOTES
Outpatient Rehab    Physical Therapy Visit    Patient Name: Peter Long  MRN: [id number]  YOB: 1985  Encounter Date: 2/24/2025    Therapy Diagnosis:   Encounter Diagnosis   Name Primary?    Acute pain of right knee [M25.561] Yes     Physician: No ref. provider found    Physician Orders: Eval and Treat       Time In: 9:00    Time Out: 10:00   Total Time: 60 min    Total Billable Time: 60 min             Subjective   Knee feeling better. Pt presents w significan ext lag due to pain and quad weakness.  Pain reported as 4/10.      Objective            Treatment:  Therapeutic Exercise  Therapeutic Exercise Activity 1: Quadsets 3x10  Therapeutic Exercise Activity 2: SAQ 3x10  Therapeutic Exercise Activity 3: Seated heel slide - 5' to tolerance / 90 degrees  Therapeutic Exercise Activity 4: Knee ext heel prop - 3'  Therapeutic Exercise Activity 5: SLR 3x10  Therapeutic Exercise Activity 6: Bike - 10'    Gait Training  Gait Training Activity 1: Gait practice w/ crutches to improve balance and confidence. - 15'    Assessment & Plan   Assessment: Session focused on improving knee ext and quad strength and control. All exercises tolerated well, SLR very challenging due to hip flexor and quad weakness. Knee flexion AROM is at 90 degrees w/o pain. Gait training w/ crutches provided, pt was scared to fall using them and presented to clinic w/o AD. Ps stated ambulation w/ crutches following instruction was much easier and felt better on her knee.  Evaluation/Treatment Tolerance: Patient tolerated treatment well    Patient will continue to benefit from skilled outpatient physical therapy to address the deficits listed in the problem list box on initial evaluation, provide pt/family education and to maximize pt's level of independence in the home and community environment.     Patient's spiritual, cultural, and educational needs considered and patient agreeable to plan of care and goals.           Plan:       Goals:   Active       LTG        Patient will improve right lower extremity  knee extension to 0 degrees  (Progressing)       Start:  02/19/25    Expected End:  05/14/25            Patient will improve knee flexion range of motion > 120 degrees  (Progressing)       Start:  02/19/25    Expected End:  05/14/25               LTG        Patient will increase right lower extremity  quadriceps strength to 4/5 (Progressing)       Start:  02/19/25    Expected End:  05/14/25                Arlen Tripp PTA

## 2025-02-27 ENCOUNTER — CLINICAL SUPPORT (OUTPATIENT)
Dept: REHABILITATION | Facility: HOSPITAL | Age: 40
End: 2025-02-27
Payer: MEDICARE

## 2025-02-27 DIAGNOSIS — M25.561 ACUTE PAIN OF RIGHT KNEE: Primary | ICD-10-CM

## 2025-02-27 PROCEDURE — 97110 THERAPEUTIC EXERCISES: CPT | Mod: PN

## 2025-02-27 PROCEDURE — 97014 ELECTRIC STIMULATION THERAPY: CPT | Mod: PN

## 2025-02-27 PROCEDURE — 97116 GAIT TRAINING THERAPY: CPT | Mod: PN

## 2025-03-05 ENCOUNTER — OFFICE VISIT (OUTPATIENT)
Dept: ORTHOPEDICS | Facility: CLINIC | Age: 40
End: 2025-03-05
Payer: MEDICARE

## 2025-03-05 VITALS
WEIGHT: 190.06 LBS | HEIGHT: 65 IN | SYSTOLIC BLOOD PRESSURE: 112 MMHG | DIASTOLIC BLOOD PRESSURE: 74 MMHG | HEART RATE: 97 BPM | BODY MASS INDEX: 31.67 KG/M2

## 2025-03-05 DIAGNOSIS — G89.18 ACUTE POST-OPERATIVE PAIN: Primary | ICD-10-CM

## 2025-03-05 PROCEDURE — 99213 OFFICE O/P EST LOW 20 MIN: CPT | Mod: PBBFAC,PN

## 2025-03-05 PROCEDURE — 99999 PR PBB SHADOW E&M-EST. PATIENT-LVL III: CPT | Mod: PBBFAC,,,

## 2025-03-05 PROCEDURE — 99024 POSTOP FOLLOW-UP VISIT: CPT | Mod: POP,,,

## 2025-03-05 RX ORDER — OXYCODONE AND ACETAMINOPHEN 5; 325 MG/1; MG/1
1 TABLET ORAL EVERY 4 HOURS PRN
Qty: 28 EACH | Refills: 0 | Status: SHIPPED | OUTPATIENT
Start: 2025-03-05

## 2025-03-05 NOTE — PROGRESS NOTES
"  Outpatient Rehab    Physical Therapy Visit    Patient Name: Peter Long  MRN: [id number]  YOB: 1985  Encounter Date: 2/27/2025    Therapy Diagnosis:   Encounter Diagnosis   Name Primary?    Acute pain of right knee Yes     Physician: Carmelina Philip, *    Physician Orders: Eval and Treat       Time In: 9:00    Time Out: 10:00   Total Time: 60 min    Total Billable Time: 60 min             Subjective   Pt stated that she is using her crutches a little more, still feels very week and like her knee with "give out".  No new pain associated with the knee, only doing some of the exercises at home          Objective    MMT: right knee ext 3+, unable to maintain prolonged quad contraction  PROM 115 deg mike end feel  PROM ext 0 deg pain at endrange     Gait: use of bilateral crutches, walking on bent knee        Treatment:  E-Stim: [place] citizen with electrode placed proximal and distal quad total of 10 min at 50%, 4/12, 50 bps.  Pt asked to perform quad set in conjunction with e-stim    Therapeutic Exercise  Therapeutic Exercise Activity 1: Quadsets 3x10  Therapeutic Exercise Activity 2: SAQ 3x10 endrange ext held 2 sec  Therapeutic Exercise Activity 3: Seated heel slide - 5' to tolerance / 90 degrees  Therapeutic Exercise Activity 4: Knee ext heel prop - 3'  Therapeutic Exercise Activity 5: SLR - stopped secondary to lack of full knee ext with activity  Therapeutic Exercise Activity 6: Bike - 8' pt givn instruction to not let knee valgus while riding   Therapeutic Exercise Activity 7: Mini squat with wt shift right 2x 10, focus on wb right LE  Therapeutic Exercise Activity 8: Shuttle squat 1 blk band 3x 1    Gait Training  Gait Training Activity 1: Gait practice w/ crutches to improve balance and confidence. - 10'  Focus on full knee ext at heelstrike         Assessment & Plan   Assessment:     Pt had imporved quad function following E-Stim.  Focus today was on quad activation both open and closed " chain.  Time spent educating again the need for proper gait mechanics to improve strength and function.    Patient will continue to benefit from skilled outpatient physical therapy to address the deficits listed in the problem list box on initial evaluation, provide pt/family education and to maximize pt's level of independence in the home and community environment.     Patient's spiritual, cultural, and educational needs considered and patient agreeable to plan of care and goals.           Plan:  Cont quad strengthening, ambulation    Goals:   Active       LTG        Patient will improve right lower extremity  knee extension to 0 degrees  (Progressing)       Start:  02/19/25    Expected End:  05/14/25            Patient will improve knee flexion range of motion > 120 degrees  (Progressing)       Start:  02/19/25    Expected End:  05/14/25               LTG        Patient will increase right lower extremity  quadriceps strength to 4/5 (Progressing)       Start:  02/19/25    Expected End:  05/14/25                Josemanuel Rod PT

## 2025-03-05 NOTE — PROGRESS NOTES
Post-op Note    HPI    Peter Long is here 2 weeks s/p the following procedure:     2/17/2025  Arthroscopic right knee chondroplasty medial femoral condyle and patellofemoral joint  Microfracture right knee medial femoral condyle    Overall doing well. Pain controlled on current regimen. She is currently enrolled in Physical Therapy, had to skip a week for childcare. Denies any chest pain or shortness of breathe. Denies any drainage from the incision. Denies any chills or paraesthesias. She did have a cold/the flu post-operatively but no systemic symptoms.     DVT Prophylaxis: n/a      Physical Exam:     Patient is alert and oriented no acute distress.   Assistive Device: none    Left knee: sutures removed. Incision(s) are well healed.  There is no evidence of dehiscence.  There is no induration erythema or signs of infection.  Appropriate soft tissue swelling.  Compartments are soft and compressible.  Warm well-perfused extremity. Large effusion. ROM 0-95 passive    Assessment    Peter Long is 2 weeks Post-op     Plan:    Overall doing as expected.  We discussed expectations of surgery and postoperative course.     Pain: Continued postoperative pain regimen -- Percocet refill sent  PT/OT: Continue/Initiate physical therapy (weight bearing status: WBAT), cont PT    Discussed her arthroscopic pictures which do show some full-thickness cartilage loss.  Discussed if she has continued pain a few months after surgery, could be candidate for viscosupplementation.    In 24 hours, you may shower without covering your incision.  Do not submerge your incision for another 2 weeks.  If steri strips applied, they can get wet, remove in 48-72 hours if not falling off on their own. Do not submerge incision for another 2 weeks. You may start to do a scar massage with vitamin-E oil/cocoa butter to your incisions. Please inform the clinic if you experience any bleeding or discharge, warmth, or redness.       Follow-up:  4 weeks   X-rays next visit: none

## 2025-03-11 ENCOUNTER — CLINICAL SUPPORT (OUTPATIENT)
Dept: REHABILITATION | Facility: HOSPITAL | Age: 40
End: 2025-03-11
Payer: MEDICARE

## 2025-03-11 DIAGNOSIS — M25.561 ACUTE PAIN OF RIGHT KNEE: Primary | ICD-10-CM

## 2025-03-11 PROCEDURE — 97110 THERAPEUTIC EXERCISES: CPT | Mod: PN

## 2025-03-11 PROCEDURE — 97112 NEUROMUSCULAR REEDUCATION: CPT | Mod: PN

## 2025-03-11 NOTE — PROGRESS NOTES
Outpatient Rehab    Physical Therapy Progress Note    Patient Name: Peter Long  MRN: [id number]  YOB: 1985  Encounter Date: 3/11/2025    Therapy Diagnosis: No diagnosis found.  Physician: Carmelina Philip, *    Physician Orders: PT Eval and Treat   Medical Diagnosis from Referral: effusion of the right knee  Evaluation Date: 2/19/2025  Insurance Authorization Period Expiration: 1/28/26  Plan of Care Certification:  2/24/2025 to 5/12/25   Progress Note Due: 3/26/2025  Visit # / Visits authorized: 3/20  Visits Remaining - 0  PTA visit #: 0/6     Time In: 1000  Time Out: 1100  Total Appointment Time : 60 minutes  Total Billable Time: 60    FOTO:  Intake Score:  %  Survey Score 1:  %  Survey Score 2:  %         Subjective   knee continues to swell.  Pain reported as 4/10.      Objective       Knee Range of Motion   Right Knee   Active (deg) Passive (deg) Pain   Flexion 110       Extension -5                      Treatment:  Therapeutic Exercise  Therapeutic Exercise Activity 1: prone quad sets - 30 res  Therapeutic Exercise Activity 2: SAQs 30  reps         Balance/Neuromuscular Re-Education  Balance/Neuromuscular Re-Education Activity 1: standing TKE's 30 reps  Balance/Neuromuscular Re-Education Activity 2: peanut ball squats - 30 reps (next visit)         Assessment & Plan   Assessment: PT continued focus on quad strength and activation during ambulation period. Pt continues to ambulate with excessive knee flexion during ambulation period. Pt re-eduacated to increase elevation and Ice to reduce right knee swelling. Pt will continue to benefit from skilled therapy to address current deficits.  Evaluation/Treatment Tolerance: Patient tolerated treatment well    Patient will continue to benefit from skilled outpatient physical therapy to address the deficits listed in the problem list box on initial evaluation, provide pt/family education and to maximize pt's level of independence in the home  and community environment.     Patient's spiritual, cultural, and educational needs considered and patient agreeable to plan of care and goals.           Plan: Continue with current POC    Goals:   Active       LTG        Patient will improve right lower extremity  knee extension to 0 degrees  (Progressing)       Start:  02/19/25    Expected End:  05/14/25            Patient will improve knee flexion range of motion > 120 degrees  (Progressing)       Start:  02/19/25    Expected End:  05/14/25               LTG        Patient will increase right lower extremity  quadriceps strength to 4/5 (Progressing)       Start:  02/19/25    Expected End:  05/14/25                Hai Renteria, PT

## 2025-03-13 ENCOUNTER — CLINICAL SUPPORT (OUTPATIENT)
Dept: REHABILITATION | Facility: HOSPITAL | Age: 40
End: 2025-03-13
Payer: MEDICARE

## 2025-03-13 DIAGNOSIS — M25.569 ACUTE KNEE PAIN, UNSPECIFIED LATERALITY: Primary | ICD-10-CM

## 2025-03-13 PROCEDURE — 97110 THERAPEUTIC EXERCISES: CPT | Mod: PN,CQ

## 2025-03-13 NOTE — PROGRESS NOTES
Outpatient Rehab    Physical Therapy Visit    Patient Name: Peter Long  MRN: [id number]  YOB: 1985  Encounter Date: 3/13/2025    Therapy Diagnosis:   Encounter Diagnosis   Name Primary?    Acute knee pain, unspecified laterality Yes     Physician: Carmelina Philip, *      Physician Orders: PT Eval and Treat   Medical Diagnosis from Referral: effusion of the right knee  Evaluation Date: 2/19/2025  Insurance Authorization Period Expiration: 1/28/26  Plan of Care Certification:  2/24/2025 to 5/12/25   Progress Note Due: 3/26/2025  Visit # / Visits authorized: 4/20  Visits Remaining - 10  PTA visit #: 1/6     Time In: 2:00    Time Out: 3:00   Total Time: 60 min    Total Billable Time: 30 min           Subjective   Pt ambulates w/ limited knee ext.  Pain reported as 2/10.      Objective            Treatment:  Therapeutic Exercise  Therapeutic Exercise Activity 2: SAQs 30  reps #2 R  Therapeutic Exercise Activity 3: GSS 4x1' lvl 2  Therapeutic Exercise Activity 4: Hamstring stretch 3x1' R  Therapeutic Exercise Activity 5: Bike- 10'  Therapeutic Exercise Activity 6: TKE against wall 2x10    Manual Therapy  Manual Therapy Activity 1: STM and myofascial release to R gastroc    Assessment & Plan   Assessment: Session focused on improving knee ext. STM performed to R gastroc to decrease tightness and pain. Hamstring and gastroc stretch added to improve passive ROM knee ext. VCs given during ambulation in the clinic to improve heel strike to promote knee ext during ambulation.  Evaluation/Treatment Tolerance: Patient tolerated treatment well    Patient will continue to benefit from skilled outpatient physical therapy to address the deficits listed in the problem list box on initial evaluation, provide pt/family education and to maximize pt's level of independence in the home and community environment.     Patient's spiritual, cultural, and educational needs considered and patient agreeable to plan of  care and goals.           Plan:      Goals:   Active       LTG        Patient will improve right lower extremity  knee extension to 0 degrees  (Progressing)       Start:  02/19/25    Expected End:  05/14/25            Patient will improve knee flexion range of motion > 120 degrees  (Progressing)       Start:  02/19/25    Expected End:  05/14/25               LTG        Patient will increase right lower extremity  quadriceps strength to 4/5 (Progressing)       Start:  02/19/25    Expected End:  05/14/25                Arlen Tripp PTA

## 2025-03-14 ENCOUNTER — CLINICAL SUPPORT (OUTPATIENT)
Dept: REHABILITATION | Facility: HOSPITAL | Age: 40
End: 2025-03-14
Payer: MEDICARE

## 2025-03-14 DIAGNOSIS — M25.569 ACUTE KNEE PAIN, UNSPECIFIED LATERALITY: Primary | ICD-10-CM

## 2025-03-14 PROCEDURE — 97110 THERAPEUTIC EXERCISES: CPT | Mod: PN,CQ

## 2025-03-14 PROCEDURE — 97140 MANUAL THERAPY 1/> REGIONS: CPT | Mod: PN,CQ

## 2025-03-14 NOTE — PROGRESS NOTES
Outpatient Rehab    Physical Therapy Visit    Patient Name: Peter Long  MRN: [id number]  YOB: 1985  Encounter Date: 3/14/2025    Therapy Diagnosis:   Encounter Diagnosis   Name Primary?    Acute knee pain, unspecified laterality Yes     Physician: Carmelina Philip, *    Physician Orders: PT Eval and Treat   Medical Diagnosis from Referral: effusion of the right knee  Evaluation Date: 2/19/2025  Insurance Authorization Period Expiration: 1/28/26  Plan of Care Certification:  2/24/2025 to 5/12/25   Progress Note Due: 3/26/2025  Visit # / Visits authorized: 5/20  Visits Remaining - 8  PTA visit #: 2/6     Time In:   9:00  Time Out:   10:00  Total Time:   60 min  Total Billable Time: 60 min           Subjective   little soreness in the calf after last visit from [facility] but feeling better now.         Objective            Treatment:  Therapeutic Exercise  Therapeutic Exercise Activity 3: GSS 3x1' lvl 3  Therapeutic Exercise Activity 5: Bike- 10'  Therapeutic Exercise Activity 6: Heel raises B 3x10 3s hold  Therapeutic Exercise Activity 7: Heel raises up on both feet, lowered on R foot x20  Therapeutic Exercise Activity 8: Step-ups 4in 2'    Manual Therapy  Manual Therapy Activity 2: Patell mobilizations in all planes  Manual Therapy Activity 3: Massage to hoffas fat  Manual Therapy Activity 4: CRFM to patella and quad tendon    Assessment & Plan   Assessment: Progressed quad and gastroc exercises to improve strength for ADLs and Ambulation. Step-ups tolerated well but very fatiguing causing the knee to buckle after. Manual therapy used to reduce pain around patella, pt reported relief following session. PTA advised pt to continue leg elevation at home to reduce swelling.  Evaluation/Treatment Tolerance: Patient tolerated treatment well    Patient will continue to benefit from skilled outpatient physical therapy to address the deficits listed in the problem list box on initial evaluation, provide  pt/family education and to maximize pt's level of independence in the home and community environment.     Patient's spiritual, cultural, and educational needs considered and patient agreeable to plan of care and goals.           Plan:      Goals:   Active       LTG        Patient will improve right lower extremity  knee extension to 0 degrees  (Progressing)       Start:  02/19/25    Expected End:  05/14/25            Patient will improve knee flexion range of motion > 120 degrees  (Progressing)       Start:  02/19/25    Expected End:  05/14/25               LTG        Patient will increase right lower extremity  quadriceps strength to 4/5 (Progressing)       Start:  02/19/25    Expected End:  05/14/25                Arlen Tripp, PTA

## 2025-03-18 ENCOUNTER — CLINICAL SUPPORT (OUTPATIENT)
Dept: REHABILITATION | Facility: HOSPITAL | Age: 40
End: 2025-03-18
Payer: MEDICARE

## 2025-03-18 DIAGNOSIS — M25.569 ACUTE KNEE PAIN, UNSPECIFIED LATERALITY: Primary | ICD-10-CM

## 2025-03-18 PROCEDURE — 97140 MANUAL THERAPY 1/> REGIONS: CPT | Mod: PN,CQ

## 2025-03-18 PROCEDURE — 97110 THERAPEUTIC EXERCISES: CPT | Mod: PN,CQ

## 2025-03-18 NOTE — PROGRESS NOTES
Outpatient Rehab    Physical Therapy Visit    Patient Name: Peter Long  MRN: [id number]  YOB: 1985  Encounter Date: 3/18/2025    Therapy Diagnosis:   Encounter Diagnosis   Name Primary?    Acute knee pain, unspecified laterality Yes     Physician: Caremlina Philip, *    Physician Orders: Eval and Treat  Medical Diagnosis: Effusion of right knee  Bodies, loose, joint, knee, right  Chondromalacia of knee, right    Physician Orders: PT Eval and Treat   Medical Diagnosis from Referral: effusion of the right knee  Evaluation Date: 2/19/2025  Insurance Authorization Period Expiration: 1/28/26  Plan of Care Certification:  2/24/2025 to 5/12/25   Progress Note Due: 3/26/2025  Visit # / Visits authorized: 5/20  Visits Remaining - 8     PT/PTA: PTA   Number of PTA visits since last PT visit:3  Time In:   9:03   Time Out:  10:00  Total Time:   57 min  Total Billable Time: 57 min             Subjective   Still some pain in the calf. Improved ambulation noted.  Pain reported as 2/10.      Objective            Treatment:  Therapeutic Exercise  TE 3: GSS 3x1' lvl 3  TE 4: Standing lunge R gastroc stretch  TE 7: Heel raises up on both feet, lowered on R foot x20  Manual Therapy  MT 1: IASTM to R gastric - 20'      Time Entry(in minutes):  Manual Therapy Time Entry: 20  Therapeutic Exercise Time Entry: 40    Assessment & Plan   Assessment: Heel raises w/ single leg eccntric motion still challenging. IASTM used to reduce tension in gastroc and improve knee ext for ambulation. Standing R lunge for dynamic gastroc stretch added. Pt reported reduced discomfort during ambulation at end of session.  Evaluation/Treatment Tolerance: Patient tolerated treatment well    Patient will continue to benefit from skilled outpatient physical therapy to address the deficits listed in the problem list box on initial evaluation, provide pt/family education and to maximize pt's level of independence in the home and community  environment.     Patient's spiritual, cultural, and educational needs considered and patient agreeable to plan of care and goals.           Plan:      Goals:   Active       LTG        Patient will improve right lower extremity  knee extension to 0 degrees  (Progressing)       Start:  02/19/25    Expected End:  05/14/25            Patient will improve knee flexion range of motion > 120 degrees  (Progressing)       Start:  02/19/25    Expected End:  05/14/25               LTG        Patient will increase right lower extremity  quadriceps strength to 4/5 (Progressing)       Start:  02/19/25    Expected End:  05/14/25                Arlen Tripp PTA

## 2025-05-09 ENCOUNTER — OFFICE VISIT (OUTPATIENT)
Dept: ORTHOPEDICS | Facility: CLINIC | Age: 40
End: 2025-05-09
Payer: MEDICARE

## 2025-05-09 VITALS
HEART RATE: 92 BPM | SYSTOLIC BLOOD PRESSURE: 110 MMHG | HEIGHT: 65 IN | BODY MASS INDEX: 31.67 KG/M2 | DIASTOLIC BLOOD PRESSURE: 75 MMHG | WEIGHT: 190.06 LBS

## 2025-05-09 DIAGNOSIS — M94.261 CHONDROMALACIA OF KNEE, RIGHT: Primary | ICD-10-CM

## 2025-05-09 PROCEDURE — 99999 PR PBB SHADOW E&M-EST. PATIENT-LVL III: CPT | Mod: PBBFAC,,,

## 2025-05-09 PROCEDURE — 99213 OFFICE O/P EST LOW 20 MIN: CPT | Mod: PBBFAC,PN

## 2025-05-09 RX ORDER — NAPROXEN 500 MG/1
500 TABLET ORAL 2 TIMES DAILY
Qty: 60 TABLET | Refills: 1 | Status: SHIPPED | OUTPATIENT
Start: 2025-05-09

## 2025-05-09 NOTE — PROGRESS NOTES
Post-op Note    HPI    Peter Long is here 12 weeks s/p the following procedure:     2/17/2025  Arthroscopic right knee chondroplasty medial femoral condyle and patellofemoral joint  Microfracture right knee medial femoral condyle    She did not come to her 6 week postop appointment and has not gone to physical therapy in about 6 weeks.  States she has been taking care of her child.  She reports compliance with home exercise program.  Denies any fevers, chills, nausea vomiting, chest pain or shortness of breath.  She does feel she has some pain whenever she is more active on the medial side of her knee.    Physical Exam:     Patient is alert and oriented no acute distress.   Assistive Device: none    Left knee: Incision(s) are well healed.  There is no evidence of dehiscence.  There is no induration erythema or signs of infection.  Appropriate soft tissue swelling.  Compartments are soft and compressible.  Warm well-perfused extremity.  Range of motion 0 to 135.  Ligaments stable to valgus and varus stress.      Assessment    Peter Long is 12 weeks Post-op     Plan:    Overall doing okay.  Again discussed her arthroscopic findings which included arthritis.  We discussed treatment options including viscosupplementation.  I do think she would be a good candidate for these.  She is going to think about it and will contact the clinic if she wishes to proceed.  May continue anti-inflammatories as needed for pain.  Encouraged icing and elevating.  She will follow up as needed.  Discussed importance of continuing HP.    Follow-up:  Send message if interested in gel injections  X-rays next visit: none